# Patient Record
Sex: MALE | Race: BLACK OR AFRICAN AMERICAN | Employment: UNEMPLOYED | URBAN - METROPOLITAN AREA
[De-identification: names, ages, dates, MRNs, and addresses within clinical notes are randomized per-mention and may not be internally consistent; named-entity substitution may affect disease eponyms.]

---

## 2021-07-17 ENCOUNTER — HOSPITAL ENCOUNTER (EMERGENCY)
Age: 56
Discharge: HOME | End: 2021-07-17
Payer: MEDICARE

## 2021-07-17 VITALS
RESPIRATION RATE: 16 BRPM | OXYGEN SATURATION: 98 % | DIASTOLIC BLOOD PRESSURE: 100 MMHG | HEIGHT: 70 IN | WEIGHT: 182 LBS | BODY MASS INDEX: 26.05 KG/M2 | HEART RATE: 81 BPM | SYSTOLIC BLOOD PRESSURE: 171 MMHG | TEMPERATURE: 98.3 F

## 2021-07-17 DIAGNOSIS — S61.511A LACERATION OF RIGHT WRIST, INITIAL ENCOUNTER: Primary | ICD-10-CM

## 2021-07-17 PROCEDURE — 99283 EMERGENCY DEPT VISIT LOW MDM: CPT

## 2021-07-17 PROCEDURE — 12002 RPR S/N/AX/GEN/TRNK2.6-7.5CM: CPT

## 2021-07-17 ASSESSMENT — PAIN DESCRIPTION - LOCATION: LOCATION: WRIST

## 2021-07-17 ASSESSMENT — PAIN DESCRIPTION - PAIN TYPE: TYPE: ACUTE PAIN

## 2021-07-17 ASSESSMENT — PAIN SCALES - GENERAL: PAINLEVEL_OUTOF10: 5

## 2021-07-17 ASSESSMENT — PAIN DESCRIPTION - DESCRIPTORS: DESCRIPTORS: ACHING;THROBBING

## 2021-07-17 ASSESSMENT — PAIN DESCRIPTION - ORIENTATION: ORIENTATION: RIGHT

## 2021-07-17 NOTE — ED NOTES
Pt suture repair applied with PSO/Adaptic/gauze/kerlex. Pt instructed on at home care of wound.      Eddie Elaine LPN  44/22/59 [id number]

## 2021-07-17 NOTE — ED PROVIDER NOTES
[address] [facility]  ED  eMERGENCY dEPARTMENT eNCOUnter        Pt Name: Kristen Collado  MRN: [id number]  [place]foscar 1965  Date of evaluation: 7/17/2021  Provider: Raina Perez PA-C  PCP: No primary care provider on file. ED Attending: Ford Reyes MD    Patient was not seen by the ED attending  History is provided by the patient    CHIEF COMPLAINT:  Hand Laceration (right wrist laceration from a pocket knike, not intentional. pt states wound was \"spirting\")      HISTORY OF PRESENT ILLNESS:  Kristen Collado is a 50 y.o. male who presents to the ED via private vehicle with complaints of right wrist laceration. Patient left-hand-dominant. Patient states he had a pocket knife in his right pant pocket. He did not realize the knife blade was open and upon putting his arm down he essentially punctured/lacerated his left wrist to the ulnar and volar aspect. Patient has an approximately 2.5 to 3 cm laceration running vertically along the volar and ulnar aspect of the distal right forearm. Patient states initially the wound was \"spurting\" blood. He has a moderate, 5/10 pain that he describes as \"aching and throbbing\". Patient placed paper towels over the wound and taped around his wrist with medical tape before coming in. Bleeding controlled on arrival.  Patient up-to-date on tetanus vaccine. No other complaints, modifying factors or associated symptoms. Nursing notes reviewed. History reviewed. No pertinent past medical history. History reviewed. No pertinent surgical history. History reviewed. No pertinent family history.   Social History     Socioeconomic History    Marital status: Single     Spouse name: Not on file    Number of children: Not on file    Years of education: Not on file    Highest education level: Not on file   Occupational History    Not on file   Tobacco Use    Smoking status: Current Every Day Smoker     Packs/day: 1.00     Types: Cigarettes   Substance and Sexual Activity    Alcohol use: Not on file    Drug use: Not on file    Sexual activity: Not on file   Other Topics Concern    Not on file   Social History Narrative    Not on file     Social Determinants of Health     Financial Resource Strain:     Difficulty of Paying Living Expenses:    Food Insecurity:     Worried About Running Out of Food in the Last Year:     [address] in the Last Year:    Transportation Needs:     Lack of Transportation (Medical):  Lack of Transportation (Non-Medical):    Physical Activity:     Days of Exercise per Week:     Minutes of Exercise per Session:    Stress:     Feeling of Stress :    Social Connections:     Frequency of Communication with Friends and Family:     Frequency of Social Gatherings with Friends and Family:     Attends [employer] Services:     Active Member of Clubs or Organizations:     Attends Club or Organization Meetings:     Marital Status:    Intimate Partner Violence:     Fear of Current or Ex-Partner:     Emotionally Abused:     Physically Abused:     Sexually Abused:      No current facility-administered medications for this encounter. No current outpatient medications on file. No Known Allergies    REVIEW OF SYSTEMS:  6 systems reviewed, pertinent positives per HPI otherwise noted to be negative. PHYSICAL EXAM:  BP (!) 171/100   Pulse 81   Temp 98.3 °F (36.8 °C) (Temporal)   Resp 16   Ht 5' 10\" (1.778 m)   Wt 182 lb (82.6 kg)   SpO2 98%   BMI 26.11 kg/m²   CONSTITUTIONAL: Awake and alert. Well-developed. Well-nourished. Non-toxic. Cooperative. No acute distress. HENT: Normocephalic. Atraumatic. External ears normal, without discharge. Nose normal. Mucous membranes moist.  EYES: Conjunctiva non-injected. No scleral icterus. PERRL. EOM's grossly intact. NECK: Supple. Normal ROM. CARDIOVASCULAR: Normal heart rate. Intact distal pulses. PULMONARY/CHEST WALL: Breathing is unlabored. Equal, symmetric chest rise.  Speaking comfortably in full sentences. ABDOMEN: Nondistended  MUSKULOSKELETAL: Normal ROM. No acute deformities. SKIN: Warm and dry. 2.7 cm laceration to the distal volar and ulnar aspect of the right forearm as pictured below. Wound extends into the subcutaneous tissue. I do not appreciate any tendon or significant vascular injury. No pulsating of blood. See picture of wound following closure. NEUROLOGICAL: Alert and oriented x 3. Strength is 5/5 in all extremities and sensation is intact. PSYCHIATRIC: Normal affect      Volar, ulnar aspect right distal forearm            Labs:    None    RADIOLOGY:    None        PROCEDURE:  LACERATION REPAIR  Cheryle London or their surrogate had an opportunity to ask questions, and the risks, benefits, and alternatives were discussed. The wound was prepped and draped to maintain a sterile field. A local anesthetic was used to completely anesthetize the wound. It was copiously irrigated. It was explored to its depth in a bloodless field with no sign of tendon, nerve, or vascular injury. No foreign bodies were identified. The 2.7 cm wound was closed with 4 simple interrupted 4-0 Prolene sutures. There were no complications during the procedure. ED COURSE/MDM:  Patient was given the following medications: None      I have evaluated this patient here in the ED. Patient arrives with a accidental laceration to the volar aspect of the right distal forearm. Wound was thoroughly examined/explored, ultimately anesthetized by myself, irrigated and repaired by me. The wound closed up nicely. No persistent bleeding. At no point was there any pulsatile bleeding. No evidence of significant vascular injury. Patient given instruction on wound care. He will need to keep the wound clean and dry. He can follow-up for suture removal in 7 to 10 days. Return precautions discussed. All questions answered prior to discharge.     I estimate there is LOW risk for FRACTURE, TENDON OR NEUROVASCULAR INJURY, or RETAINED FOREIGN BODY, thus I consider the discharge disposition reasonable. Also, there is no evidence or peritonitis, sepsis, or toxicity. Susan Hutson and I have discussed the diagnosis and risks, and we agree with discharging home to follow-up with their primary doctor. We also discussed returning to the Emergency Department immediately if new or worsening symptoms occur. We have discussed the symptoms which are most concerning (e.g., changing or worsening pain, fever, numbness, weakness, cool or painful digits) that necessitate immediate return. CLINICAL IMPRESSION:  1. Laceration of right wrist, initial encounter        Blood pressure (!) 171/100, pulse 81, temperature 98.3 °F (36.8 °C), temperature source Temporal, resp. rate 16, height 5' 10\" (1.778 m), weight 182 lb (82.6 kg), SpO2 98 %. PATIENT REFERRED TO:  [facility]  ED  [address]  Go to   As needed        DISPOSITION  Patient was discharged to home in good condition.           Jeanie Pike  07/17/21 [id number]

## 2021-07-17 NOTE — ED NOTES
Pt instructed to follow up with PCP for suture removal. Assessed per [place] LERMA.      Miguelito Watts LPN  23/09/69 [id number]